# Patient Record
Sex: MALE | Race: AMERICAN INDIAN OR ALASKA NATIVE
[De-identification: names, ages, dates, MRNs, and addresses within clinical notes are randomized per-mention and may not be internally consistent; named-entity substitution may affect disease eponyms.]

---

## 2021-01-19 ENCOUNTER — HOSPITAL ENCOUNTER (EMERGENCY)
Dept: HOSPITAL 5 - ED | Age: 26
Discharge: LEFT BEFORE DISCHARGE | End: 2021-01-19
Payer: SELF-PAY

## 2021-01-19 VITALS — SYSTOLIC BLOOD PRESSURE: 90 MMHG | DIASTOLIC BLOOD PRESSURE: 56 MMHG

## 2021-01-19 DIAGNOSIS — Z53.21: ICD-10-CM

## 2021-01-19 DIAGNOSIS — R10.2: Primary | ICD-10-CM

## 2021-01-19 NOTE — EMERGENCY DEPARTMENT REPORT
Chief Complaint: Urogenital-Male


Stated Complaint: IRRITATION IN PELVIC AREA


Time Seen by Provider: 01/19/21 13:26





- HPI


History of Present Illness: 

















Patient is a 25-year-old male who presents emergency room with complaints of 

irritation to the penis that began a week ago.  He states that he had sexual 

intercourse and noticed a few lesions.  He states that he then used Joseph in the 

area.  He states it has been improving and that is just irritated.  He denies 

any dysuria, penile discharge, pain or swelling in the testicles, abdominal 

pain, fever, nausea, vomiting, diarrhea, hematuria, urinary retention.  No past 

medical history.  No allergies to medications.











Vitals are stable

















chaperone: RADHA mark


there is an abrasion to the ventral surface of the penis, there are also a few 

small healing shallow ulcerations, normal testicular lie, normal cremasteric r

eflex, no scrotal edema, no tenderness palpation of the testicles bilaterally, 

no epididymal edema or tenderness palpation bilaterally














Examination appears consistent with abrasion, there are also a few small healing

shallow ulcerations which could be a possibility of genital HSV


Patient will be referred to a clinic and the health department in order to have 

a full STD panel


Discussed strict return precautions with patient


advised pt Please use triple antibiotic or Neosporin ointment.  Please do not 

use Joseph.  Please follow-up with the clinic or the health department to have a 

full STD panel.  You need to be tested for the herpes virus.  Do not engage in 

sexual intercourse.  This is very contagious.  These have any partner tested and

treated as well.  Return to emergency room for any worsening symptoms.











Medical screening examination performed and there is no threat to life or limb 

at this time





Patient given the appropriate resources





- Exam


Vital Signs: 


                                   Vital Signs











  01/19/21 01/19/21





  13:21 13:26


 


Temperature 98.2 F 


 


Pulse Rate 85 


 


Respiratory 16 





Rate  


 


Blood Pressure  90/56





[Right]  


 


O2 Sat by Pulse 100 





Oximetry  











MSE screening note: 


Focused history and physical exam performed.














ED Disposition for MSE


Clinical Impression: 


 Penile lesion





Penile abrasion


Qualifiers:


 Encounter type: initial encounter Qualified Code(s): S30.812A - Abrasion of 

penis, initial encounter





Disposition: Z-07 MED SCREENING EXAM-LEFT


Is pt being admited?: No


Does the pt Need Aspirin: No


Condition: Stable


Additional Instructions: 








Please use triple antibiotic or Neosporin ointment.  Please do not use Joseph.  

Please follow-up with the clinic or the health department to have a full STD 

panel.  You need to be tested for the herpes virus.  Do not engage in sexual 

intercourse.  This is very contagious.  These have any partner tested and 

treated as well.  Return to emergency room for any worsening symptoms.














Wikirin


Address: [address] [place] [id number]


Phone: [phone]


Referrals: 


[facility] [Outside] - 2-3 Days


Time of Disposition: 13:27


Print Language: ENGLISH